# Patient Record
Sex: MALE | Race: WHITE | NOT HISPANIC OR LATINO | URBAN - METROPOLITAN AREA
[De-identification: names, ages, dates, MRNs, and addresses within clinical notes are randomized per-mention and may not be internally consistent; named-entity substitution may affect disease eponyms.]

---

## 2019-02-14 ENCOUNTER — OFFICE VISIT - HEALTHEAST (OUTPATIENT)
Dept: PHYSICAL THERAPY | Facility: REHABILITATION | Age: 64
End: 2019-02-14

## 2019-02-14 ENCOUNTER — COMMUNICATION - HEALTHEAST (OUTPATIENT)
Dept: TELEHEALTH | Facility: CLINIC | Age: 64
End: 2019-02-14

## 2019-02-14 DIAGNOSIS — M62.81 GENERALIZED MUSCLE WEAKNESS: ICD-10-CM

## 2019-02-14 DIAGNOSIS — M77.00 MEDIAL EPICONDYLITIS, UNSPECIFIED LATERALITY: ICD-10-CM

## 2019-02-14 DIAGNOSIS — M25.521 RIGHT ELBOW PAIN: ICD-10-CM

## 2019-02-22 ENCOUNTER — OFFICE VISIT - HEALTHEAST (OUTPATIENT)
Dept: PHYSICAL THERAPY | Facility: REHABILITATION | Age: 64
End: 2019-02-22

## 2019-02-22 DIAGNOSIS — M62.81 GENERALIZED MUSCLE WEAKNESS: ICD-10-CM

## 2019-02-22 DIAGNOSIS — M77.00 MEDIAL EPICONDYLITIS, UNSPECIFIED LATERALITY: ICD-10-CM

## 2019-02-22 DIAGNOSIS — M25.521 RIGHT ELBOW PAIN: ICD-10-CM

## 2019-03-11 ENCOUNTER — OFFICE VISIT - HEALTHEAST (OUTPATIENT)
Dept: PHYSICAL THERAPY | Facility: REHABILITATION | Age: 64
End: 2019-03-11

## 2019-03-11 DIAGNOSIS — M77.00 MEDIAL EPICONDYLITIS, UNSPECIFIED LATERALITY: ICD-10-CM

## 2019-03-11 DIAGNOSIS — M62.81 GENERALIZED MUSCLE WEAKNESS: ICD-10-CM

## 2019-03-11 DIAGNOSIS — M25.521 RIGHT ELBOW PAIN: ICD-10-CM

## 2021-06-17 NOTE — PATIENT INSTRUCTIONS - HE
Patient Instructions by Chantel Pepe PT at 2/22/2019 11:30 AM     Author: Chantel Pepe PT Service: -- Author Type: Physical Therapist    Filed: 2/22/2019 12:08 PM Encounter Date: 2/22/2019 Status: Signed    : Chantel Pepe PT (Physical Therapist)             BICEPS CURL    With your arm at your side, draw up your hand by bending at the elbow.     Keep your palm face up the entire time.    Up for 1 count - slow for 3 count down.    Try up to 10 lb dumbbell    x10-15 reps 2-3 sets  1-2x/day         SUPINATION AND PRONATION    Rest your forearm on your knee or a table. Next, while holding the end of a small weight, slowly lower the weight towards the outside and then rotate your forearm towards the inside of your body as shown.     x10-20 reps x1-2 sets  x1-2x/day  Try 3 lbs slow lower         WRIST FLEXION    Hold a small free weight, rest your forearm on a table and bend your wrist up and down with your palm face up as shown.  x10-20 reps 2 sets  1-2x/day  Try 3-5 lbs         Warm wrist up with bending back and forth and elbow bending up and down  Before stretching      WRIST FLEXOR STRETCH    Use your unaffected hand to bend the affected wrist up as shown.     Keep the elbow straight on the affected side the entire time.  x10-20 seconds x2-3 reps  1-2x/day           WRIST FLEXOR STRETCH    Keep palms together and slowly lower wrists until a stretch is felt.  x10-20 seconds x2-3 reps  1-2x/day     Meds  Generic info about anti-inflammatories - take with food!  Ibuprofen (advil) - could do 200 mg 2-3 pills 2-3x/day for up to 7 days in a row  Naproxen (aleve) - could do 1-2 tablets morning and nightime up to 7 days in a row    Cross friction massage 1-2x/day  Crossways over tendons lower towards hand and on top of forearm  5-6 minutes - want it to feel like relief within the first 1-2 minutes     Ice - 10 minutes 1-2x/day - until numb

## 2021-06-17 NOTE — PATIENT INSTRUCTIONS - HE
Patient Instructions by Chantel Pepe PT at 3/11/2019 12:30 PM     Author: Chantel Pepe PT Service: -- Author Type: Physical Therapist    Filed: 3/11/2019  1:22 PM Encounter Date: 3/11/2019 Status: Signed    : Chantel Pepe PT (Physical Therapist)         Dips - let legs take as much body weight as needed to feel some work into triceps - 10-15 reps 1-2 sets 3-5x/week          BICEPS CURL    With your arm at your side, draw up your hand by bending at the elbow.     Keep your palm face up the entire time.    Up for 1 count - slow for 3 count down.    Try up to 8-10 lb dumbbell    x10-15 reps 2-3 sets  1-2x/day         SUPINATION AND PRONATION    Rest your forearm on your knee or a table. Next, while holding the end of a small weight, slowly lower the weight towards the outside and then rotate your forearm towards the inside of your body as shown.     x10-20 reps x1-2 sets  x1-2x/day  Try 3 lbs slow lower - then can go 5 lb         WRIST FLEXION    Hold a small free weight, rest your forearm on a table and bend your wrist up and down with your palm face up as shown.  x10-20 reps 2 sets  1-2x/day  Try 3-5 lbs         Warm wrist up with bending back and forth and elbow bending up and down  Before stretching      WRIST FLEXOR STRETCH    Use your unaffected hand to bend the affected wrist up as shown.     Keep the elbow straight on the affected side the entire time.  x10-20 seconds x2-3 reps  1-2x/day           WRIST FLEXOR STRETCH    Keep palms together and slowly lower wrists until a stretch is felt.  x10-20 seconds x2-3 reps  1-2x/day     Cross friction massage 1x/day  Crossways over tendons lower towards hand and on top of forearm  5-6 minutes - want it to feel like relief within the first 1-2 minutes

## 2021-06-17 NOTE — PATIENT INSTRUCTIONS - HE
Patient Instructions by Chantel Pepe PT at 2/14/2019  3:00 PM     Author: Chantel Pepe PT Service: -- Author Type: Physical Therapist    Filed: 2/14/2019  4:11 PM Encounter Date: 2/14/2019 Status: Signed    : Chantel Pepe PT (Physical Therapist)             BICEPS CURL    With your arm at your side, draw up your hand by bending at the elbow.     Keep your palm face up the entire time.    Up for 1 count - slow for 3 count down.  x10-15 reps 2-3 sets  1-2x/day         SUPINATION AND PRONATION    Rest your forearm on your knee or a table. Next, while holding the end of a small weight, slowly lower the weight towards the outside and then rotate your forearm towards the inside of your body as shown.     x10-20 reps x1-2 sets  x1-2x/day     Warm wrist up with bending back and forth and elbow bending up and down  Before stretching      WRIST FLEXOR STRETCH    Use your unaffected hand to bend the affected wrist up as shown.     Keep the elbow straight on the affected side the entire time.  x10-20 seconds x2-3 reps  1-2x/day           WRIST FLEXOR STRETCH    Keep palms together and slowly lower wrists until a stretch is felt.  x10-20 seconds x2-3 reps  1-2x/day     Meds  Generic info about anti-inflammatories - take with food!  Ibuprofen (advil) - could do 200 mg 2-3 pills 2-3x/day for up to 7 days in a row  Naproxen (aleve) - could do 1-2 tablets morning and nightime up to 7 days in a row    Cross friction massage 1-2x/day  Crossways over tendons lower towards hand and on top of forearm  5-6 minutes - want it to feel like relief within the first 1-2 minutes     Ice - 10 minutes 1-2x/day - until numb

## 2021-06-24 NOTE — PROGRESS NOTES
[facility] Daily Progress     Patient Name: Ricco Cannon  Date: 3/11/2019  Visit #: 3/12  Referring Provider: Anuj Baker MD  Referring Diagnosis: MEJIA parker's elbow  Visit Diagnosis:     ICD-10-CM    1. Right elbow pain M25.521    2. Medial epicondylitis, unspecified laterality M77.00    3. Generalized muscle weakness M62.81        Assessment:     Pt with improvement to pain with able to play golf with mild irritation only. Pt feels like he is getting stronger and has gone up with lb with exercises.    Patient is benefitting from skilled physical therapy and is making steady progress toward functional goals.  Patient is appropriate to continue with skilled physical therapy intervention, as indicated by initial plan of care.    Goal Status:  Pt. will demonstrate/verbalize independence in self-management of condition in : 12 weeks    Patient will be able to: gripping;holding;for lifting;for driving;for housework;for meal preparation;with less pain;with less difficulty;in 12 weeks - IMPROVING - was able to snowmobile with decreased  without increase to pain    Pt will: be able to play golf and table tennis with minimal to no elbow pain in 12 weeks for improved quality of life. - IMPROVING - did 1 round       Plan / Patient Education:     Continue with initial plan of care.  Assess response to HEP and dips in 4 weeks.    Subjective:     Pain Ratin/10 - barely noticeable - only with more lifting or golfing moves.    Pt reports being able to golf last week on vacation 1 round of golf and only felt pain a little bit on a couple of the shots.    Pt took naproxen before and after his golf round but hasn't taken any sense.    Objective:     Mild tender with mod tone R pronator teres, flexor carpi ulnaris/radialis/digitorum origin    Able to perform resisted eccentric R elbow pronation with 3 lbs with minimal pain    Treatment Today      TREATMENT MINUTES COMMENTS   Evaluation     Self-care/ Home  management     Manual therapy 14 Reassessed R elbow palpation. CFM with deep tissue release R pronator teres and anterior flexor tendon group.   Neuromuscular Re-education     Therapeutic Activity     Therapeutic Exercises 24 Discussed results with HEP so far. Performed PROM R elbow combined flex/ext and sup/pron with wrist flex/ext x20 reps x8 sets. Performed and advanced loading with elbow/wrist strengthening per pt instructions and printed for home.   Gait training     Modality__________________                Total 38    Blank areas are intentional and mean the treatment did not include these items.       Chantel Pepe PT, DPT, OCS, CLT  3/11/2019  5:44 PM

## 2021-06-24 NOTE — PROGRESS NOTES
[facility] Daily Progress     Patient Name: Ricco Cannon  Date: 2019  Visit #:   Referring Provider: Anuj Baker MD  Referring Diagnosis: MEJIA parker's elbow  Visit Diagnosis:     ICD-10-CM    1. Right elbow pain M25.521    2. Medial epicondylitis, unspecified laterality M77.00    3. Generalized muscle weakness M62.81        Assessment:     Pt with sx continuing for pronator teres dysfunction.    Patient is benefitting from skilled physical therapy and is making steady progress toward functional goals.  Patient is appropriate to continue with skilled physical therapy intervention, as indicated by initial plan of care.    Goal Status:  Pt. will demonstrate/verbalize independence in self-management of condition in : 12 weeks    Patient will be able to: gripping;holding;for lifting;for driving;for housework;for meal preparation;with less pain;with less difficulty;in 12 weeks - IMPROVING - was able to snowmobile with decreased  without increase to pain    Pt will: be able to play golf and table tennis with minimal to no elbow pain in 12 weeks for improved quality of life.      Plan / Patient Education:     Continue with initial plan of care.  Assess response to HEP and conference and golfing.  Attempt weight bearing dips and progress strength.      Subjective:     Pain Ratin  Pt reports maybe a little less morning stiffness - maybe from medication.     Pt reports going snowmobiling last weekend and did need to relax his hand and not  as hard - pt did feel okay with the extra stretching.    Objective:     Tender R pronator teres, flexor carpi ulnaris/radialis/digitorum origin  Non-tender with sx reproduction with R elbow jt mobility testing and end range flex and ext WNL without pain  Mild pain mid range bicep curl with 10 lb load, sx reproduction with eccentric pronation, less pain with extended elbow wrist flexor stretch versus flexed elbow    Treatment Today      TREATMENT  MINUTES COMMENTS   Evaluation     Self-care/ Home management     Manual therapy 10 Reassessed R elbow palpation and jt testing. CFM R pronator teres.   Neuromuscular Re-education     Therapeutic Activity     Therapeutic Exercises 16 Discussed results with HEP so far. Performed and advanced loading with elbow/wrist strengthening per pt instructions and printed for home.   Gait training     Modality__________________                Total 26    Blank areas are intentional and mean the treatment did not include these items.       Chantel Pepe PT, DPT, OCS, CLT  2/22/2019  11:39 AM

## 2021-06-24 NOTE — PROGRESS NOTES
[facility]   Initial Evaluation    Patient Name: Ricco Cannon  Date of evaluation: 2/14/2019  Visit number: 1/12  Referring Provider: Self-referred  Referring Diagnosis: MEJIA parker's elbow  Visit Diagnosis:     ICD-10-CM    1. Right elbow pain M25.521    2. Medial epicondylitis, unspecified laterality M77.00    3. Generalized muscle weakness M62.81        Assessment:        Ricco Cannon is a 63 y.o. male who presents to therapy today with chief complaints of R elbow pain. Onset date of sx was November 2018 after pt had been doing a lot of yardwork with a rotatiller, then was exacerbated by playing some golf, then was exacerbated again on a golf trip in mid January.  Pt reported since his return in January pain hasn't felt like it is getting much better even without doing any golfing.  Pain symptoms are mild to moderate centrally located around medial elbow without any radiation into wrist or hand.  Functional impairments include difficulty and pain with house and yard work with like shoveling, shaking hands, carrying, gripping and playing table tennis and golfing.  Pt demo's signs and sx consistent with R medial epicondylitis with pronator teres weakness and pain, pain with active wrist and finger flexion with negative neural tension testing.     Pt. is appropriate for skilled PT intervention as outlined in the Plan of Care (POC).  Pt. is a good candidate for skilled PT services to improve pain levels and function.    Goals:  Pt. will demonstrate/verbalize independence in self-management of condition in : 12 weeks    Patient will be able to: gripping;holding;for lifting;for driving;for housework;for meal preparation;with less pain;with less difficulty;in 12 weeks  Pt will: be able to play golf and table tennis with minimal to no elbow pain in 12 weeks for improved quality of life.      Patient's expectations/goals are realistic.    Barriers to Learning or Achieving Goals:  No Barriers.       Plan  / Patient Instructions:      Plan for next visit: Assess response to eccentric light loading, stretching, anti-inflammatories, icing and CFM.   Attempt increasing load for wrist flexion, pronation, elbow flexion and ulnar deviation.  WRIST flexion was left off of pt instructions.    Plan of Care:   Communication with: Referral Source  Patient Related Instruction: Nature of Condition;Treatment plan and rationale;Self Care instruction;Basis of treatment;Body mechanics;Posture;Precautions;Next steps;Expected outcome  Times per Week: 1  Number of Weeks: 12  Number of Visits: 12  Discharge Planning: when indicated  Precautions / Restrictions : none  Therapeutic Exercise: ROM;Stretching;Strengthening  Neuromuscular Reeducation: posture;TNE;core;other  Neuromuscular Re-education: neurodynamics  Manual Therapy: soft tissue mobilization;joint mobilization;muscle energy  Functional Training (ADL's): self care;ADL's    Treatment techniques, plan of care, and goals were discussed with the patient.  The patient agrees to the plan as outlined.  The plan of care is dynamic and will be modified on an ongoing basis.       Subjective:       Social information:   Occupation:Tech    Work Status:Working full time    History of Present Illness:  Pt reports R elbow pain. Started Nov with gardening   Pt reports now when he gets up in the morning he notices stiffness and pain in the morning and it is very achy. Pt recently went on a golf trip mid January and he had to quit playing due to pain being so high.  Since he has been home he feels like the pain has not improved.   Pt reports he has tried an elbow strap without any benefit.     Pain Ratin  Pain rating at best: 2  Pain rating at worst: 10  Pain description: aching and pain    Patient reports benefit from:  rest  , movement or exercise , cold       Objective:        Precautions/Restrictions: None  Involved side: Right  Posture Observation:      General sitting posture  is  normal.  Gait: WNL  Palpation: Moderately tender over pronator teres, medial epicondyle, mild tender over flexor digitorum and FCU and FCR tendons  Tender over ulnar nerve without peripheral sx     ROM: cervical ROM WFL - mod limited with stiffness but no R elbow sx  B shoulder ROM WFL - min limited with ER but not elbow pain  Wrist ROM WNL but painful stretch at medial elbow with wrist extension end range    Strength: B UE grossly 5/5 except IR painful, R pronator 4/5 with significant pain, R finger flexors 5/5 but painful, R wrist flexors 5/5 but painful    Sensation: Intact to light touch R UE    Special tests: Negative ULTT R UE median, radial and ulnar  Negative opposition testing for weakness    Treatment Today      TREATMENT MINUTES COMMENTS   Evaluation 30 R elbow   Self-care/ Home management 12 Discussed rationale and use of ice and anti-inflammatories for swelling and inflammation per pt instructions and printed for home.   Manual therapy 6 R pronator teres CFM x3 minutes, educated pt on self-CFM for home program with written instructions per pt instructions and printed for home   Neuromuscular Re-education     Therapeutic Activity     Therapeutic Exercises 16 Discussed eval findings and POC. HEP instruction per Patient Instructions with written handout given.     Gait training     Modality__________________                Total 64    Blank areas are intentional and mean the treatment did not include these items.        PT Evaluation Code: (Please list factors)  Patient History/Comorbidities: none  Examination: R elbow  Clinical Presentation: stable  Clinical Decision Making: low    Patient History/  Comorbidities Examination  (body structures and functions, activity limitations, and/or participation restrictions) Clinical Presentation Clinical Decision Making (Complexity)   No documented Comorbidities or personal factors 1-2 Elements Stable and/or uncomplicated Low   1-2 documented comorbidities or  personal factor 3 Elements Evolving clinical presentation with changing characteristics Moderate   3-4 documented comorbidities or personal factors 4 or more Unstable and unpredictable High       Chantel Pepe PT, DPT, OCS, CLT  2/14/2019  3:05 PM

## 2021-08-21 ENCOUNTER — HEALTH MAINTENANCE LETTER (OUTPATIENT)
Age: 66
End: 2021-08-21

## 2021-10-16 ENCOUNTER — HEALTH MAINTENANCE LETTER (OUTPATIENT)
Age: 66
End: 2021-10-16

## 2022-05-22 ENCOUNTER — HEALTH MAINTENANCE LETTER (OUTPATIENT)
Age: 67
End: 2022-05-22

## 2022-09-25 ENCOUNTER — HEALTH MAINTENANCE LETTER (OUTPATIENT)
Age: 67
End: 2022-09-25

## 2023-06-04 ENCOUNTER — HEALTH MAINTENANCE LETTER (OUTPATIENT)
Age: 68
End: 2023-06-04